# Patient Record
Sex: MALE | Race: WHITE | NOT HISPANIC OR LATINO
[De-identification: names, ages, dates, MRNs, and addresses within clinical notes are randomized per-mention and may not be internally consistent; named-entity substitution may affect disease eponyms.]

---

## 2024-10-01 ENCOUNTER — NON-APPOINTMENT (OUTPATIENT)
Age: 59
End: 2024-10-01

## 2024-10-03 ENCOUNTER — APPOINTMENT (OUTPATIENT)
Dept: ORTHOPEDIC SURGERY | Facility: CLINIC | Age: 59
End: 2024-10-03
Payer: COMMERCIAL

## 2024-10-03 VITALS — HEIGHT: 68 IN | BODY MASS INDEX: 25.01 KG/M2 | WEIGHT: 165 LBS

## 2024-10-03 DIAGNOSIS — Z83.3 FAMILY HISTORY OF DIABETES MELLITUS: ICD-10-CM

## 2024-10-03 PROBLEM — Z00.00 ENCOUNTER FOR PREVENTIVE HEALTH EXAMINATION: Status: ACTIVE | Noted: 2024-10-03

## 2024-10-03 PROCEDURE — 73140 X-RAY EXAM OF FINGER(S): CPT | Mod: RT

## 2024-10-03 PROCEDURE — 99203 OFFICE O/P NEW LOW 30 MIN: CPT | Mod: 25

## 2024-10-03 NOTE — ASSESSMENT
[FreeTextEntry1] : 59-year-old male with right fourth distal phalanx fracture.  I have placed him in a AlumaFoam splint which she will wear full-time for 2 weeks and follow-up at that time to see Patrick LUCAS for reassessment.  He can use over-the-counter medication for pain as needed and will continue antibiotics as given by the hospital.  He is aware if there is any issue contact me in the office he verbalized understanding and agreement.

## 2024-10-03 NOTE — IMAGING
[de-identified] : R IV digit: Steri-Strips over the distal phalanx, neurovascularly intact, positive tenderness over the distal phalanx, full range of motion of PIP and MCP, mild edema over distal phalanx.  X-ray right fourth digit 3 views in splint: Distal phalanx fracture in acceptable alignment.

## 2024-10-03 NOTE — HISTORY OF PRESENT ILLNESS
[de-identified] : Mr. Farr is a 59 year old LHD male who presents to the walk in for evaluation of a right fourth digit injury that occurred yesterday.  Patient is an  and states upon entering attendants apartment was bit in the R IV digit by a [employer] dog.   He went to the emergency room for the laceration and x-rays were taken which did show a right distal phalanx slightly dorsally displaced fracture.  He was placed in a splint and the wound was Steri-Stripped.  He was given oral antibiotic and advised to follow-up.  He states he believes the dog is fully vaccinated but is unsure.  He states there is pain in the distal part of the finger but denies any numbness.  He can move at the PIP joint and MCP P joint.  He is experiencing no issues with other digits.  He has not taken anything for pain.   No respiratory distress. No stridor, Lungs sounds clear with good aeration bilaterally.

## 2024-10-10 ENCOUNTER — APPOINTMENT (OUTPATIENT)
Dept: ORTHOPEDIC SURGERY | Facility: CLINIC | Age: 59
End: 2024-10-10
Payer: COMMERCIAL

## 2024-10-10 ENCOUNTER — RESULT CHARGE (OUTPATIENT)
Age: 59
End: 2024-10-10

## 2024-10-10 DIAGNOSIS — S62.638A DISPLACED FRACTURE OF DISTAL PHALANX OF OTHER FINGER, INITIAL ENCOUNTER FOR CLOSED FRACTURE: ICD-10-CM

## 2024-10-10 PROCEDURE — 99213 OFFICE O/P EST LOW 20 MIN: CPT

## 2024-10-10 PROCEDURE — 73140 X-RAY EXAM OF FINGER(S): CPT | Mod: RT

## 2024-10-10 RX ORDER — SULFAMETHOXAZOLE AND TRIMETHOPRIM 800; 160 MG/1; MG/1
800-160 TABLET ORAL TWICE DAILY
Qty: 20 | Refills: 0 | Status: ACTIVE | COMMUNITY
Start: 2024-10-10 | End: 1900-01-01

## 2024-10-17 ENCOUNTER — APPOINTMENT (OUTPATIENT)
Dept: ORTHOPEDIC SURGERY | Facility: CLINIC | Age: 59
End: 2024-10-17

## 2024-10-23 ENCOUNTER — APPOINTMENT (OUTPATIENT)
Dept: ORTHOPEDIC SURGERY | Facility: CLINIC | Age: 59
End: 2024-10-23
Payer: COMMERCIAL

## 2024-10-23 DIAGNOSIS — S62.638A DISPLACED FRACTURE OF DISTAL PHALANX OF OTHER FINGER, INITIAL ENCOUNTER FOR CLOSED FRACTURE: ICD-10-CM

## 2024-10-23 PROCEDURE — 99213 OFFICE O/P EST LOW 20 MIN: CPT

## 2024-11-06 ENCOUNTER — APPOINTMENT (OUTPATIENT)
Dept: ORTHOPEDIC SURGERY | Facility: CLINIC | Age: 59
End: 2024-11-06

## 2024-12-02 ENCOUNTER — APPOINTMENT (OUTPATIENT)
Dept: ORTHOPEDIC SURGERY | Facility: CLINIC | Age: 59
End: 2024-12-02
Payer: COMMERCIAL

## 2024-12-02 DIAGNOSIS — S62.638A DISPLACED FRACTURE OF DISTAL PHALANX OF OTHER FINGER, INITIAL ENCOUNTER FOR CLOSED FRACTURE: ICD-10-CM

## 2024-12-02 PROCEDURE — 99202 OFFICE O/P NEW SF 15 MIN: CPT
